# Patient Record
Sex: FEMALE | Race: WHITE | NOT HISPANIC OR LATINO | Employment: OTHER | URBAN - METROPOLITAN AREA
[De-identification: names, ages, dates, MRNs, and addresses within clinical notes are randomized per-mention and may not be internally consistent; named-entity substitution may affect disease eponyms.]

---

## 2017-01-01 ENCOUNTER — HOSPITAL ENCOUNTER (EMERGENCY)
Facility: HOSPITAL | Age: 82
End: 2017-08-06
Attending: EMERGENCY MEDICINE | Admitting: EMERGENCY MEDICINE

## 2017-01-01 DIAGNOSIS — I46.9 CARDIAC ARREST (HCC): Primary | ICD-10-CM

## 2017-01-01 DIAGNOSIS — I46.9 PEA (PULSELESS ELECTRICAL ACTIVITY) (HCC): ICD-10-CM

## 2017-01-01 PROCEDURE — 99283 EMERGENCY DEPT VISIT LOW MDM: CPT

## 2017-01-01 PROCEDURE — 92950 HEART/LUNG RESUSCITATION CPR: CPT

## 2017-08-07 NOTE — ED PROVIDER NOTES
EMERGENCY DEPARTMENT ENCOUNTER    CHIEF COMPLAINT  Chief Complaint: Cardiac Arrest  History given by: EMS  History limited by: Acuity of condition  Room Number: 20/20  PMD: Jefferson Brown Jr., MD      HPI:  Pt is a 89 y.o. female who presents with cardiac arrest. Pt was found down in her bedroom at home; collapse was unwitnessed. EMS was called at 1932 and arrived at 1935, at which time CPR was begun. Pt was PEA upon EMS arrival. An I/O was inserted in right leg. Pt received 11 epi and sodium bicarbonate via EMS. BG was over 200. EMS reports that pt's relative had a DNR form filled out but not signed. EMS arrived with pt at 2017.     Location of Arrest - Home  Witnessed Arrest - No  Bystander CPR - No  Time of Collapse - unknown  Time CPR Initiated - 1935  [employer] [facility] on Scene - EMS   Time on Scene - 1935  AED Used - Yes  Dispatch Time - 1932  EMS on Scene Time - 1935   EMS Agency - [facility]  Initial Cardiac Rhythm - PEA  ROSC in Field - No  Time of Arrival to [facility] ED - 2017   STEMI - unknown   Treatment Prior to Arrival - epinephrine, sodium bicarbonate       PAST MEDICAL HISTORY  Active Ambulatory Problems     Diagnosis Date Noted   • No Active Ambulatory Problems     Resolved Ambulatory Problems     Diagnosis Date Noted   • No Resolved Ambulatory Problems     No Additional Past Medical History       PAST SURGICAL HISTORY  No past surgical history on file.    FAMILY HISTORY  No family history on file.    SOCIAL HISTORY  Social History     Social History   • Marital status:      Spouse name: N/A   • Number of children: N/A   • Years of education: N/A     Occupational History   • Not on file.     Social History Main Topics   • Smoking status: Not on file   • Smokeless tobacco: Not on file   • Alcohol use Not on file   • Drug use: Not on file   • Sexual activity: Not on file     Other Topics Concern   • Not on file     Social History Narrative       ALLERGIES  Review of patient's allergies  indicates not on file.    REVIEW OF SYSTEMS  Review of Systems   Unable to perform ROS: Patient unresponsive       PHYSICAL EXAM  ED Triage Vitals   Temp Pulse Resp BP SpO2   -- -- -- -- --             Temp src Heart Rate Source Patient Position BP Location FiO2 (%)   -- -- -- -- --              Physical Exam   Constitutional:   Unresponsive.    HENT:   ET Tube in pharynx.    Eyes:   Pupils dilated.    Cardiovascular:   No cardiac activity.    Pulmonary/Chest:   No spontaneous respirations.    Abdominal: Soft. There is no tenderness.   Musculoskeletal:   I/O in right leg.    Skin: Bruising (diffuse) noted.       PROCEDURES  Procedures      PROGRESS AND CONSULTS  ED Course     2017: Pt arrived in ED. Pt was PEA upon arrival.     : Time of death called.    : Spoke with pt's family. Pt's daughter reports that seemed weaker and complains of sore throat today but otherwise complained of nothing specific.     MEDICAL DECISION MAKING  Results were reviewed/discussed with the patient and they were also made aware of online access. Pt also made aware that some labs, such as cultures, will not be resulted during ER visit and follow up with PMD is necessary.     MDM  Number of Diagnoses or Management Options  Cardiac arrest:   PEA (Pulseless electrical activity):      Amount and/or Complexity of Data Reviewed  Obtain history from someone other than the patient: yes (Spoke with family in regards to evaluation)  Review and summarize past medical records: yes (ER visit  reviewed)    Patient Progress  Patient progress: other (comment) ()         DIAGNOSIS  Final diagnoses:   Cardiac arrest   PEA (Pulseless electrical activity)       DISPOSITION      Latest Documented Vital Signs:  As of 8:30 PM  BP-   HR-   Temp-   O2 sat-      --  Documentation assistance provided by renu Lal for Bar Barker.  Information recorded by the renu was done at my direction and has been verified and validated by  me.         Nilda Lal  08/06/17 2046       Bar Barker MD  08/06/17 2048       Bar Barker MD  08/06/17 2048

## 2017-08-07 NOTE — ED NOTES
Pt arrived to ER in full arrest.  Found by son at home.  Family states pt has been deteriorating over the last several days.       Joy Strickland RN  08/06/17 [id number]